# Patient Record
Sex: MALE | Race: OTHER | Employment: FULL TIME | URBAN - METROPOLITAN AREA
[De-identification: names, ages, dates, MRNs, and addresses within clinical notes are randomized per-mention and may not be internally consistent; named-entity substitution may affect disease eponyms.]

---

## 2017-02-08 ENCOUNTER — APPOINTMENT (OUTPATIENT)
Dept: LAB | Age: 27
End: 2017-02-08
Attending: FAMILY MEDICINE
Payer: COMMERCIAL

## 2017-02-08 DIAGNOSIS — Z00.00 ROUTINE PHYSICAL EXAMINATION: ICD-10-CM

## 2017-02-08 LAB
CHOLEST SERPL-MCNC: 149 MG/DL (ref 110–200)
GLUCOSE SERPL-MCNC: 91 MG/DL (ref 70–99)
HDLC SERPL-MCNC: 38 MG/DL
LDLC SERPL CALC-MCNC: 86 MG/DL (ref 0–99)
NONHDLC SERPL-MCNC: 111 MG/DL
TRIGL SERPL-MCNC: 126 MG/DL (ref 1–149)
TSH SERPL-ACNC: 0.67 UIU/ML (ref 0.34–5.6)

## 2017-02-08 PROCEDURE — 82947 ASSAY GLUCOSE BLOOD QUANT: CPT

## 2017-02-08 PROCEDURE — 80061 LIPID PANEL: CPT

## 2017-02-08 PROCEDURE — 36415 COLL VENOUS BLD VENIPUNCTURE: CPT

## 2017-02-08 PROCEDURE — 84443 ASSAY THYROID STIM HORMONE: CPT

## 2022-07-10 ENCOUNTER — APPOINTMENT (OUTPATIENT)
Dept: GENERAL RADIOLOGY | Age: 32
End: 2022-07-10
Attending: NURSE PRACTITIONER
Payer: COMMERCIAL

## 2022-07-10 ENCOUNTER — HOSPITAL ENCOUNTER (OUTPATIENT)
Age: 32
Discharge: HOME | End: 2022-07-10
Payer: COMMERCIAL

## 2022-07-10 VITALS
TEMPERATURE: 98 F | WEIGHT: 270 LBS | HEART RATE: 87 BPM | SYSTOLIC BLOOD PRESSURE: 160 MMHG | HEIGHT: 75 IN | DIASTOLIC BLOOD PRESSURE: 76 MMHG | BODY MASS INDEX: 33.57 KG/M2 | RESPIRATION RATE: 18 BRPM | OXYGEN SATURATION: 98 %

## 2022-07-10 DIAGNOSIS — S20.211A RIB CONTUSION, RIGHT, INITIAL ENCOUNTER: Primary | ICD-10-CM

## 2022-07-10 PROCEDURE — 71101 X-RAY EXAM UNILAT RIBS/CHEST: CPT | Performed by: NURSE PRACTITIONER

## 2022-07-10 PROCEDURE — 99203 OFFICE O/P NEW LOW 30 MIN: CPT | Performed by: NURSE PRACTITIONER

## 2022-07-10 NOTE — ED INITIAL ASSESSMENT (HPI)
Pt presents with right mid-rib area pain. Pt reports \"I was going down a [employer] game on the lawn and struck my right rib area on the ground, area is tender to the touch\". Occurred at 5p last evening. No diff breathing. Mild pain with deep breath. No medication taken today.

## 2023-08-31 ENCOUNTER — OFFICE VISIT (OUTPATIENT)
Dept: INTERNAL MEDICINE CLINIC | Facility: CLINIC | Age: 33
End: 2023-08-31

## 2023-08-31 VITALS
HEIGHT: 75 IN | SYSTOLIC BLOOD PRESSURE: 138 MMHG | HEART RATE: 119 BPM | WEIGHT: 272 LBS | OXYGEN SATURATION: 97 % | TEMPERATURE: 99 F | BODY MASS INDEX: 33.82 KG/M2 | DIASTOLIC BLOOD PRESSURE: 78 MMHG

## 2023-08-31 DIAGNOSIS — Z13.1 SCREENING FOR DIABETES MELLITUS: ICD-10-CM

## 2023-08-31 DIAGNOSIS — E55.9 VITAMIN D DEFICIENCY: ICD-10-CM

## 2023-08-31 DIAGNOSIS — Z00.00 ANNUAL PHYSICAL EXAM: Primary | ICD-10-CM

## 2023-08-31 DIAGNOSIS — Z13.220 SCREENING FOR LIPOID DISORDERS: ICD-10-CM

## 2023-08-31 DIAGNOSIS — Z13.0 SCREENING FOR DEFICIENCY ANEMIA: ICD-10-CM

## 2023-08-31 DIAGNOSIS — Z13.29 SCREENING FOR THYROID DISORDER: ICD-10-CM

## 2023-08-31 DIAGNOSIS — R00.2 PALPITATIONS: ICD-10-CM

## 2023-08-31 PROCEDURE — 3078F DIAST BP <80 MM HG: CPT | Performed by: INTERNAL MEDICINE

## 2023-08-31 PROCEDURE — 3008F BODY MASS INDEX DOCD: CPT | Performed by: INTERNAL MEDICINE

## 2023-08-31 PROCEDURE — 3075F SYST BP GE 130 - 139MM HG: CPT | Performed by: INTERNAL MEDICINE

## 2023-08-31 PROCEDURE — 99385 PREV VISIT NEW AGE 18-39: CPT | Performed by: INTERNAL MEDICINE

## 2023-08-31 RX ORDER — MULTIVITAMIN WITH IRON
1 TABLET ORAL DAILY
COMMUNITY

## 2023-12-04 ENCOUNTER — MED REC SCAN ONLY (OUTPATIENT)
Dept: INTERNAL MEDICINE CLINIC | Facility: CLINIC | Age: 33
End: 2023-12-04

## 2024-07-10 ENCOUNTER — HOSPITAL ENCOUNTER (OUTPATIENT)
Age: 34
Discharge: HOME | End: 2024-07-10
Payer: COMMERCIAL

## 2024-07-10 VITALS
RESPIRATION RATE: 18 BRPM | DIASTOLIC BLOOD PRESSURE: 98 MMHG | TEMPERATURE: 98 F | SYSTOLIC BLOOD PRESSURE: 155 MMHG | HEART RATE: 90 BPM | OXYGEN SATURATION: 96 %

## 2024-07-10 DIAGNOSIS — J02.9 VIRAL PHARYNGITIS: Primary | ICD-10-CM

## 2024-07-10 DIAGNOSIS — R03.0 ELEVATED BLOOD PRESSURE READING: ICD-10-CM

## 2024-07-10 LAB — S PYO AG THROAT QL: NEGATIVE

## 2024-07-10 PROCEDURE — 99213 OFFICE O/P EST LOW 20 MIN: CPT | Performed by: NURSE PRACTITIONER

## 2024-07-10 PROCEDURE — 87880 STREP A ASSAY W/OPTIC: CPT | Performed by: NURSE PRACTITIONER

## 2024-07-10 NOTE — ED PROVIDER NOTES
Patient Seen in: [facility]      History     Chief Complaint   Patient presents with    Sore Throat     Entered by patient    Sore Throat     Stated Complaint: Sore Throat    Subjective:   Patient is a 34-year-old male who presents today for sore throat that started this morning.  Reports the sore throat is right-sided.  No fever.  No cough.  No chest pain or shortness of breath.  No nausea, vomiting, diarrhea, abdominal pain. Tolerating po.      Objective:   Past Medical History:    Environmental allergies    Seasonal allergies              Past Surgical History:   Procedure Laterality Date    Excision turbinate  12/16/15    Excision turbinate,submucous  12/16/15    Repair of nasal septum  12/16/15                Social History     Socioeconomic History    Marital status:    Tobacco Use    Smoking status: Some Days     Passive exposure: Current    Smokeless tobacco: Never    Tobacco comments:     Social smoker    Substance and Sexual Activity    Alcohol use: Yes     Alcohol/week: 0.0 standard drinks of alcohol     Comment: Weekends     Drug use: No              Review of Systems   All other systems reviewed and are negative.      Positive for stated Chief Complaint: Sore Throat (Entered by patient) and Sore Throat    Other systems are as noted in HPI.  Constitutional and vital signs reviewed.      All other systems reviewed and negative except as noted above.    Physical Exam     ED Triage Vitals [07/10/24 1239]   BP (!) 155/98   Pulse 90   Resp 18   Temp 98 °F (36.7 °C)   Temp src Temporal   SpO2 96 %   O2 Device None (Room air)       Current Vitals:   Vital Signs  BP: (!) 155/98  Pulse: 90  Resp: 18  Temp: 98 °F (36.7 °C)  Temp src: Temporal    Oxygen Therapy  SpO2: 96 %  O2 Device: None (Room air)            Physical Exam  Constitutional:       Appearance: He is well-developed.   HENT:      Mouth/Throat:      Mouth: Mucous membranes are moist.      Pharynx: Uvula midline. Posterior  oropharyngeal erythema present.      Tonsils: No tonsillar exudate or tonsillar abscesses. 0 on the right. 0 on the left.   Cardiovascular:      Rate and Rhythm: Normal rate and regular rhythm.   Pulmonary:      Effort: Pulmonary effort is normal.      Breath sounds: Normal breath sounds.   Musculoskeletal:      Cervical back: Normal range of motion and neck supple.   Neurological:      Mental Status: He is alert.         ED Course     Labs Reviewed   POCT RAPID STREP - Normal       MDM        Medical Decision Making  Differentials include: Strep pharyngitis, viral pharyngitis, peritonsillar abscess.    HPI and exam consistent with viral pharyngitis.  Strep test negative today.  Patient is tolerating p.o., no sign of peritonsillar abscess on exam.  Discussed supportive care including Tylenol, Ibuprofen, fluids, cepacol.  Return precautions were discussed.  Advised follow-up with primary care provider if no improvement in 1 week. Patient verbalized understanding and agreeable to plan of care.    Discussed elevated blood pressure with patient today.  Advised to keep a log of blood pressures 3 times per day for the next 5 days, and bring to primary care provider.  For blood pressure 180/100 or higher, patient is to go to the emergency room, especially if it is associated with headache, chest pain, shortness of breath, dizziness, or lightheadedness.  Discussed the implications of untreated elevated blood pressure.  Patient verbalized understanding.    Risk  OTC drugs.        Disposition and Plan     Clinical Impression:  1. Viral pharyngitis    2. Elevated blood pressure reading         Disposition:  Discharge  7/10/2024 12:57 pm    Follow-up:  No follow-up provider specified.        Medications Prescribed:  Discharge Medication List as of 7/10/2024 12:58 PM

## 2024-07-10 NOTE — DISCHARGE INSTRUCTIONS
Please take acetaminophen (Tylenol) 650-1000 mg every 6 hours for fever/pain  OR  Ibuprofen (Motrin, Advil) 600 mg every 6 hours for fever/pain, with food  Warm fluids  Throat drops/lozenges e.g. Halls, Cepacol  Warm salt water gargles (1/2 teaspoon of salt to 8 oz of warm water)  Return for any new/worsening symptoms  See your primary care provider if no improvement in 1 week    .Your blood pressure is high today. Please check your blood pressure at home 3 times per day, and write it down. Do this for 5 days. Follow up with your doctor in 5 days, and bring the blood pressure log with you for review. For now, avoid high sodium foods. Avoid decongestants like Afrin nasal spray or Sudafed. Blood pressure over 180/100 is concerning, if you have 2 consecutive readings like this, please call your primary care provider or go directly to the emergency room. If you develop chest pain, shortness of breath, dizziness, headache or other unusual symptoms, please go to the emergency room.

## (undated) NOTE — Clinical Note
February 11, 2017     [facility].  Carloz Jensen      Dear Bisi Mate:    Below are the results from your recent visit:    Resulted Orders   TSH W REFLEX TO FREE T4   Result Value Ref Range    TSH 0.67 0.34-5.60 uIU/mL   LIPI